# Patient Record
Sex: MALE
[De-identification: names, ages, dates, MRNs, and addresses within clinical notes are randomized per-mention and may not be internally consistent; named-entity substitution may affect disease eponyms.]

---

## 2023-01-06 PROBLEM — Z00.00 ENCOUNTER FOR PREVENTIVE HEALTH EXAMINATION: Status: ACTIVE | Noted: 2023-01-06

## 2023-01-10 ENCOUNTER — APPOINTMENT (OUTPATIENT)
Dept: ORTHOPEDIC SURGERY | Facility: CLINIC | Age: 48
End: 2023-01-10
Payer: COMMERCIAL

## 2023-01-10 VITALS — WEIGHT: 185 LBS | BODY MASS INDEX: 23 KG/M2 | HEIGHT: 75 IN

## 2023-01-10 DIAGNOSIS — M25.562 PAIN IN LEFT KNEE: ICD-10-CM

## 2023-01-10 DIAGNOSIS — S83.242A OTHER TEAR OF MEDIAL MENISCUS, CURRENT INJURY, LEFT KNEE, INITIAL ENCOUNTER: ICD-10-CM

## 2023-01-10 PROCEDURE — 99203 OFFICE O/P NEW LOW 30 MIN: CPT

## 2023-01-10 NOTE — PHYSICAL EXAM
[de-identified] : Left knee on exam today range of motion is full the knee is stable to AP stress varus valgus stress there is point tenderness to palpation of the medial joint line positive Valle sign. [de-identified] : Knee x-rays were ordered today.  AP standing individual lateral sunrise views were obtained showing well-preserved joint space in all 3 compartments no evidence of joint space narrowing or any bony abnormalities with particular attention paid to the medial compartment of the left knee.

## 2023-01-10 NOTE — HISTORY OF PRESENT ILLNESS
[de-identified] : First-time visit for this otherwise healthy 47-year-old gentleman.  He is here with a 5-month history of left medial knee pain.  Patient states that he first noticed the pain about 2 days after playing tennis pain became intense its intermittent ever since then.  He has ongoing discomfort but it is more of an on again off again phenomenon.  Patient has seen another orthopedic surgeon in his neighborhood where he lives in [place] an MRI was ordered and the results are available for review the findings were believed to be reviewed today.  The MRI indicates patient has a displaced medial meniscal tear with no evidence of arthritis and no evidence of bony edema in the region.  No ligamentous injuries noted.  Patient is anxious to return to full sporting activity he finds if he cannot do that at this point because of sharp intermittent pain.

## 2023-01-10 NOTE — REASON FOR VISIT
[Initial Visit] : an initial visit for [Knee Pain] : knee pain [FreeTextEntry2] : Lt knee pain. Pt states the pain started in 08/22. Pt states the pain started 1-2 days after playing tennis.

## 2023-01-10 NOTE — DISCUSSION/SUMMARY
[Surgical risks reviewed] : Surgical risks reviewed [de-identified] : Patient I had a long discussion about the underlying etiology of his left medial knee pain.  He has a clinical exam history radiographs and MR sequencing all consistent with a displaced medial meniscal tear.  Utilizing a model we were able to review the pertinent anatomy I indicated to the patient how a torn fragment could cause intermittent pain similar to what he is experiencing due to the torn fragment moving from the periphery to the center of the joint.  We used the analogy of a pebble in her shoe.\par \par We listed the potential options for the patient including continued observation.  I believe that he should engage in vigorous physical therapy to help improve quadriceps tone and potentially stabilize the knee.  If the patient decides in the short-term or after moderate amount of physical therapy that the symptoms are not acceptable he should consider knee arthroscopic surgery.  Reasonable risk and benefits of the surgical procedure were discussed including typical convalescence expectations and return to sporting time frame.\par \par Patient will consider this option of given a very specific exercise regimen to help improve his quadriceps tone.  He will consider this option and contact us if he would like to proceed with the arthroscopic surgery.\par \par Plan patient will engage in quadricep strengthening routine on his own accord.  He will consider knee arthroscopic surgery after a few weeks of intense physical therapy if symptoms do not improve.\par \par Today's consultation lasted approximate 40 minutes.

## 2024-10-21 ENCOUNTER — TRANSCRIPTION ENCOUNTER (OUTPATIENT)
Age: 49
End: 2024-10-21